# Patient Record
Sex: FEMALE | Race: WHITE | URBAN - METROPOLITAN AREA
[De-identification: names, ages, dates, MRNs, and addresses within clinical notes are randomized per-mention and may not be internally consistent; named-entity substitution may affect disease eponyms.]

---

## 2018-09-17 ENCOUNTER — OFFICE VISIT (OUTPATIENT)
Dept: DERMATOLOGY | Age: 48
End: 2018-09-17

## 2018-09-17 DIAGNOSIS — L57.0 ACTINIC KERATOSIS: Primary | ICD-10-CM

## 2018-09-17 DIAGNOSIS — Z78.9 NON-TOBACCO USER: ICD-10-CM

## 2018-09-17 DIAGNOSIS — L82.1 SEBORRHEIC KERATOSES: ICD-10-CM

## 2018-09-17 PROCEDURE — 1036F TOBACCO NON-USER: CPT | Performed by: DERMATOLOGY

## 2018-09-17 PROCEDURE — 17000 DESTRUCT PREMALG LESION: CPT | Performed by: DERMATOLOGY

## 2018-09-17 PROCEDURE — 99202 OFFICE O/P NEW SF 15 MIN: CPT | Performed by: DERMATOLOGY

## 2018-09-17 PROCEDURE — G8421 BMI NOT CALCULATED: HCPCS | Performed by: DERMATOLOGY

## 2018-09-17 PROCEDURE — G8427 DOCREV CUR MEDS BY ELIG CLIN: HCPCS | Performed by: DERMATOLOGY

## 2018-09-17 NOTE — PROGRESS NOTES
status: Never Smoker    Smokeless tobacco: Never Used    Alcohol use No    Drug use: No    Sexual activity: Not on file     Other Topics Concern    Not on file     Social History Narrative    No narrative on file       Physical Examination     The following were examined and determined to be normal: Psych/Neuro, LUE and LLE, gums/teeth/lips. The following were examined and determined to be abnormal: Head/face. -General: NAD, well-nourished, well-developed. Areas of skin examined as listed above:   1. Nasal tip- ill defined irreg shaped gritty keratotic pink macule(s)   2. stuck-on appearing tan-brown verrucous papules located to left upper cutaneous lip, left knee, left upper arm    Assessment and Plan     1. Actinic keratosis    2. Seborrheic keratoses    3. Non-tobacco user        1. Actinic keratosis  -Edu re: relationship with chronic cumulative sun exposure, low premalignant potential.   -1 lesion(s) treated w/ liquid nitrogen x 1 cycles, 3 seconds each located to nasal tip. Edu re: risk of blister formation, discomfort, scar, hypopigmentation. Discussed wound care. -Reviewed sun protective behavior -- sun avoidance during the peak hours of the day, sun-protective clothing (including hat and sunglasses), sunscreen use (water resistant, broad spectrum, SPF at least 30, need for reapplication every 2 to 3 hours). -Patient to contact office if AK fails to resolve despite treatment, or if patient develops side effect from therapy, such as unbearable crusting, scabbing, redness, or tenderness.    - [place] DESTRUC PREMALIGNANT, FIRST LESION    2. Seborrheic keratoses  Patient educated that seborrheic keratoses have no malignant potential.    -Reassurance provided to the patient regarding their chronic and benign nature. No treatment performed    3. Non-tobacco user  -continue with tobacco cessation        Note is transcribed using voice recognition software.  Inadvertent computerized transcription

## 2018-09-17 NOTE — PATIENT INSTRUCTIONS
Cryosurgery (Freezing) Wound Care Instructions    AFTER THE PROCEDURE:    You will notice swelling and redness around the site. This is normal.    You may experience a sharp or sore feeling for the next several days. For this discomfort, you may take acetaminophen (Tylenol©).  A blister may develop at the treated area, sometimes as soon as by the end of the day. After several days, the blister will subside and a scab will form.  If the area is bumped or traumatized during the first few days following freezing, you may develop bleeding into the blister, forming a blood blister. This is nothing to be alarmed about.  If the blister is tense, uncomfortable, or much larger than the site that was frozen, you may pop the blister along its edge with a sterile needle (boiled, heated under a flame, or cleaned with alcohol) to allow the fluid to drain out. If the blister does not bother you, no treatment is needed.  Do NOT peel off the top of the blister roof. It will act as a dressing on top of your wound. WOUND CARE:    You may shower or bathe as usual, but avoid scrubbing the areas that have been frozen.  Cleanse the site twice a day with mild soapy water, and then apply a thin film of white petrolatum (Vaseline©).  You do not need to cover the area, but can if you prefer.  Do NOT allow the site to become dry or crusted, or attempt to dry it out with rubbing alcohol or hydrogen peroxide.  Continue this regimen until the area is pink and healed. Depending on the size and location of your cryosurgery site, healing may take 2 to 4 weeks.  The area may continue to be pink for several weeks, and over the next few months may become darker or lighter than the surrounding skin. This may be a permanent change. Seborrheic keratosis    Educational Overview:  Seborrheic keratosis (seb-o-REE-ik care-uh-TOE-sis) is a common benign, or harmless, skin growth that affect people over the age of 27.  They are not cancer and do not increase the risk of developing skin cancer. The exact cause is unknown but the tendency to develop SKs seems to be inherited. Almost all adults develop one or more seborrheic keratoses (SKs) and some people may develop many. Some growths may have a warty surface while others look like dabs of warm, brown candle wax on the skin. Seborrheic keratoses range in color from white to black; however, most are tan or brown. You can find these harmless growths anywhere on the skin, except the palms and soles. Most often, youll see them on the chest, back, head, or neck. The condition is more likely with advancing age, and the number of growths often increases over the years. Seborrheic keratoses are not contagious. Because of the benign nature of seborrheic keratoses, they can be left untreated if they are non-problematic  In cases where SKs are consistently irritated with shaving, itch or bleed excessively, enlarge, become irritated by clothing or other sources of contact, or are cosmetically undesirable, please contact your Dermatologist for evaluation and removal recommendations.      Ref: [employer]

## 2025-08-21 ENCOUNTER — ANESTHESIA EVENT (OUTPATIENT)
Dept: ENDOSCOPY | Age: 55
End: 2025-08-21
Payer: COMMERCIAL

## 2025-08-22 ENCOUNTER — ANESTHESIA (OUTPATIENT)
Dept: ENDOSCOPY | Age: 55
End: 2025-08-22
Payer: COMMERCIAL

## 2025-08-22 ENCOUNTER — HOSPITAL ENCOUNTER (OUTPATIENT)
Age: 55
Setting detail: OUTPATIENT SURGERY
Discharge: HOME | End: 2025-08-22
Attending: INTERNAL MEDICINE | Admitting: INTERNAL MEDICINE
Payer: COMMERCIAL

## 2025-08-22 VITALS
HEART RATE: 100 BPM | DIASTOLIC BLOOD PRESSURE: 78 MMHG | WEIGHT: 205 LBS | OXYGEN SATURATION: 100 % | RESPIRATION RATE: 16 BRPM | SYSTOLIC BLOOD PRESSURE: 137 MMHG | TEMPERATURE: 97.4 F | BODY MASS INDEX: 32.18 KG/M2 | HEIGHT: 67 IN

## 2025-08-22 DIAGNOSIS — R13.19 ESOPHAGEAL DYSPHAGIA: ICD-10-CM

## 2025-08-22 DIAGNOSIS — Z12.11 SPECIAL SCREENING FOR MALIGNANT NEOPLASMS, COLON: ICD-10-CM

## 2025-08-22 PROCEDURE — 6360000002 HC RX W HCPCS: Performed by: NURSE ANESTHETIST, CERTIFIED REGISTERED

## 2025-08-22 PROCEDURE — 2709999900 HC NON-CHARGEABLE SUPPLY: Performed by: INTERNAL MEDICINE

## 2025-08-22 PROCEDURE — 3609010600 HC COLONOSCOPY POLYPECTOMY SNARE/COLD BIOPSY: Performed by: INTERNAL MEDICINE

## 2025-08-22 PROCEDURE — 7100000010 HC PHASE II RECOVERY - FIRST 15 MIN: Performed by: INTERNAL MEDICINE

## 2025-08-22 PROCEDURE — 3700000000 HC ANESTHESIA ATTENDED CARE: Performed by: INTERNAL MEDICINE

## 2025-08-22 PROCEDURE — 88305 TISSUE EXAM BY PATHOLOGIST: CPT

## 2025-08-22 PROCEDURE — 2580000003 HC RX 258: Performed by: ANESTHESIOLOGY

## 2025-08-22 PROCEDURE — 7100000011 HC PHASE II RECOVERY - ADDTL 15 MIN: Performed by: INTERNAL MEDICINE

## 2025-08-22 PROCEDURE — 3609012700 HC EGD DILATION SAVORY: Performed by: INTERNAL MEDICINE

## 2025-08-22 PROCEDURE — 3700000001 HC ADD 15 MINUTES (ANESTHESIA): Performed by: INTERNAL MEDICINE

## 2025-08-22 PROCEDURE — C1769 GUIDE WIRE: HCPCS | Performed by: INTERNAL MEDICINE

## 2025-08-22 PROCEDURE — 3609012400 HC EGD TRANSORAL BIOPSY SINGLE/MULTIPLE: Performed by: INTERNAL MEDICINE

## 2025-08-22 RX ORDER — MIDAZOLAM HYDROCHLORIDE 1 MG/ML
2 INJECTION, SOLUTION INTRAMUSCULAR; INTRAVENOUS
Status: DISCONTINUED | OUTPATIENT
Start: 2025-08-22 | End: 2025-08-22 | Stop reason: HOSPADM

## 2025-08-22 RX ORDER — SODIUM CHLORIDE 0.9 % (FLUSH) 0.9 %
5-40 SYRINGE (ML) INJECTION EVERY 12 HOURS SCHEDULED
Status: DISCONTINUED | OUTPATIENT
Start: 2025-08-22 | End: 2025-08-22 | Stop reason: HOSPADM

## 2025-08-22 RX ORDER — SODIUM CHLORIDE 9 MG/ML
INJECTION, SOLUTION INTRAVENOUS PRN
Status: DISCONTINUED | OUTPATIENT
Start: 2025-08-22 | End: 2025-08-22 | Stop reason: HOSPADM

## 2025-08-22 RX ORDER — LIDOCAINE HYDROCHLORIDE 20 MG/ML
INJECTION, SOLUTION INFILTRATION; PERINEURAL
Status: DISCONTINUED | OUTPATIENT
Start: 2025-08-22 | End: 2025-08-22 | Stop reason: SDUPTHER

## 2025-08-22 RX ORDER — PROPOFOL 10 MG/ML
INJECTION, EMULSION INTRAVENOUS
Status: DISCONTINUED | OUTPATIENT
Start: 2025-08-22 | End: 2025-08-22 | Stop reason: SDUPTHER

## 2025-08-22 RX ORDER — SODIUM CHLORIDE, SODIUM LACTATE, POTASSIUM CHLORIDE, CALCIUM CHLORIDE 600; 310; 30; 20 MG/100ML; MG/100ML; MG/100ML; MG/100ML
INJECTION, SOLUTION INTRAVENOUS CONTINUOUS
Status: DISCONTINUED | OUTPATIENT
Start: 2025-08-22 | End: 2025-08-22 | Stop reason: HOSPADM

## 2025-08-22 RX ORDER — FEZOLINETANT 45 MG/1
45 TABLET, FILM COATED ORAL DAILY
COMMUNITY

## 2025-08-22 RX ORDER — SODIUM CHLORIDE 0.9 % (FLUSH) 0.9 %
5-40 SYRINGE (ML) INJECTION PRN
Status: DISCONTINUED | OUTPATIENT
Start: 2025-08-22 | End: 2025-08-22 | Stop reason: HOSPADM

## 2025-08-22 RX ORDER — LIDOCAINE HYDROCHLORIDE 10 MG/ML
1 INJECTION, SOLUTION EPIDURAL; INFILTRATION; INTRACAUDAL; PERINEURAL
Status: DISCONTINUED | OUTPATIENT
Start: 2025-08-22 | End: 2025-08-22 | Stop reason: HOSPADM

## 2025-08-22 RX ADMIN — SODIUM CHLORIDE, POTASSIUM CHLORIDE, SODIUM LACTATE AND CALCIUM CHLORIDE: 600; 310; 30; 20 INJECTION, SOLUTION INTRAVENOUS at 08:55

## 2025-08-22 RX ADMIN — PROPOFOL 100 MG: 10 INJECTION, EMULSION INTRAVENOUS at 08:57

## 2025-08-22 RX ADMIN — PROPOFOL 100 MG: 10 INJECTION, EMULSION INTRAVENOUS at 09:09

## 2025-08-22 RX ADMIN — LIDOCAINE HYDROCHLORIDE 60 MG: 20 INJECTION, SOLUTION INFILTRATION; PERINEURAL at 08:57

## 2025-08-22 RX ADMIN — PROPOFOL 100 MG: 10 INJECTION, EMULSION INTRAVENOUS at 09:18

## 2025-08-22 RX ADMIN — PROPOFOL 100 MG: 10 INJECTION, EMULSION INTRAVENOUS at 09:04

## 2025-08-22 RX ADMIN — PROPOFOL 100 MG: 10 INJECTION, EMULSION INTRAVENOUS at 09:26

## 2025-08-22 ASSESSMENT — PAIN - FUNCTIONAL ASSESSMENT
PAIN_FUNCTIONAL_ASSESSMENT: 0-10
PAIN_FUNCTIONAL_ASSESSMENT: 0-10

## 2025-08-22 ASSESSMENT — PAIN SCALES - GENERAL: PAINLEVEL_OUTOF10: 0

## (undated) DEVICE — TIP SUCTION TRANSPAR RIB SURF STD BLB RIG  YANKAUER

## (undated) DEVICE — FORCEP BX STD CAP 240CM RAD JAW 4

## (undated) DEVICE — BITE BLK L LUMN SZ 20X27MM GRN PLAS FLX SIDE PRT SMOOTH

## (undated) DEVICE — GUIDEWIRE ENDOSCP MARKED SPRING TIP 210 CM SAFEGUIDE DISP

## (undated) DEVICE — CANNULA SUPERSOFT AD 7FT STD

## (undated) DEVICE — SNARE ENDOSCP L240CM SHTH DIA24MM LOOP W10MM POLYP RND REINF

## (undated) DEVICE — TRAP POLYP ETRAP

## (undated) DEVICE — ENDOSCOPIC KIT 2 12 FT OP4 DE2 GWN SYR

## (undated) DEVICE — ELECTRODE RADIOTRANSLUCENT FOAM MEDGEL

## (undated) DEVICE — Device

## (undated) DEVICE — PROCEDURE KIT ENDOSCP CARRY-ON